# Patient Record
Sex: MALE | Race: WHITE | Employment: OTHER | URBAN - METROPOLITAN AREA
[De-identification: names, ages, dates, MRNs, and addresses within clinical notes are randomized per-mention and may not be internally consistent; named-entity substitution may affect disease eponyms.]

---

## 2020-12-21 ENCOUNTER — TELEPHONE (OUTPATIENT)
Dept: AUDIOLOGY | Facility: CLINIC | Age: 75
End: 2020-12-21

## 2020-12-21 NOTE — TELEPHONE ENCOUNTER
Patient rescheduled his missed appointment for 12/28.  To clarify what needed to be done, I contacted his niece and she told me patient has a 20 page [facility] with all the referral details, which  He will bring with him.    Ankit Shine MA, CCC-A  [place] Licensed Audiologist #[id number]  [facility]

## 2020-12-28 ENCOUNTER — OFFICE VISIT (OUTPATIENT)
Dept: AUDIOLOGY | Facility: CLINIC | Age: 75
End: 2020-12-28
Payer: COMMERCIAL

## 2020-12-28 PROCEDURE — 99207 PR NO CHARGE LOS: CPT | Performed by: AUDIOLOGIST

## 2020-12-28 PROCEDURE — 92550 TYMPANOMETRY & REFLEX THRESH: CPT | Performed by: AUDIOLOGIST

## 2020-12-28 PROCEDURE — 92557 COMPREHENSIVE HEARING TEST: CPT | Performed by: AUDIOLOGIST

## 2020-12-28 NOTE — PROGRESS NOTES
AUDIOLOGY REPORT    SUBJECTIVE:  Ash Kirby is a 75 year old male who was seen in the Audiology Clinic at the [facility] for audiologic evaluation, referred by [facility] . The patient reports declining hearing. The patient denies  bilateral tinnitus, bilateral otalgia, bilateral drainage and bilateral aural fullness.  He reports  service 30 years ago where he experienced [employer] combat experience while stationed in [place]. The patient notes difficulty with communication in a variety of listening situations.      OBJECTIVE:    Otoscopic exam indicates partial obstruction with cerumen bilaterally     Pure Tone Thresholds assessed using conventional audiometry with good  reliability from 250-8000 Hz bilaterally using insert earphones and circumaural headphones     RIGHT:  normal from 250-2000 Hz, sloping to mild-moderate sensorineural hearing loss from 6700-5960  Hz    LEFT:    normal from 250-28911 Hz sloping to mild, moderate and severe sensorineural hearing loss from 7625-6562 Hz    Tympanogram:    RIGHT: normal eardrum mobility    LEFT:   normal eardrum mobility    Reflexes (reported by stimulus ear):  RIGHT: Ipsilateral is present at normal levels  RIGHT: Contralateral is elevated at tested frequencies  LEFT:   Ipsilateral is present at normal levels  LEFT:   Contralateral is present at normal levels      Speech Reception Threshold:    RIGHT: 25 dB HL    LEFT:   25 dB HL    Speech Reception Thresholds are in good agreement with pure tone thresholds.    Word Recognition Score:     RIGHT: 100% at 65 dB HL using NU-6 recorded word list.    LEFT:   96% at 65 dB HL using NU-6 recorded word list.      ASSESSMENT:     ICD-10-CM    1. 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     PLAN:  Patient was counseled regarding hearing loss and impact on communication.  Patient is a good candidate for  amplification at this time and has been approved for hearing aids by the [facility]. It is recommended that the patient see ENT due to asymmetrical hearing loss and for medical clearance for hearing aid use.  Following his  ENT appointment, if medical clearance is received, I will do a hearing aid consultation and will [facility] the [facility].  Please call this clinic with questions regarding these results or recommendations.      Ankit Shine MA, CCC-A  [place] Licensed Audiologist #[id number]  12/28/2020

## 2021-01-25 ENCOUNTER — OFFICE VISIT (OUTPATIENT)
Dept: OTOLARYNGOLOGY | Facility: CLINIC | Age: 76
End: 2021-01-25
Payer: COMMERCIAL

## 2021-01-25 ENCOUNTER — TELEPHONE (OUTPATIENT)
Dept: AUDIOLOGY | Facility: CLINIC | Age: 76
End: 2021-01-25

## 2021-01-25 ENCOUNTER — OFFICE VISIT (OUTPATIENT)
Dept: AUDIOLOGY | Facility: CLINIC | Age: 76
End: 2021-01-25
Payer: COMMERCIAL

## 2021-01-25 VITALS
RESPIRATION RATE: 16 BRPM | DIASTOLIC BLOOD PRESSURE: 70 MMHG | OXYGEN SATURATION: 96 % | SYSTOLIC BLOOD PRESSURE: 113 MMHG | HEART RATE: 77 BPM

## 2021-01-25 DIAGNOSIS — Z53.9 ERRONEOUS ENCOUNTER--DISREGARD: Primary | ICD-10-CM

## 2021-01-25 DIAGNOSIS — H90.3 SNHL (SENSORY-NEURAL HEARING LOSS), ASYMMETRICAL: Primary | ICD-10-CM

## 2021-01-25 PROCEDURE — 99203 OFFICE O/P NEW LOW 30 MIN: CPT | Performed by: OTOLARYNGOLOGY

## 2021-01-25 RX ORDER — LEVOTHYROXINE SODIUM 75 UG/1
75 TABLET ORAL
COMMUNITY

## 2021-01-25 RX ORDER — TRIAMCINOLONE ACETONIDE 0.1 %
PASTE (GRAM) DENTAL 2 TIMES DAILY
Qty: 5 G | Refills: 11 | Status: CANCELLED | OUTPATIENT
Start: 2021-01-25 | End: 2021-02-01

## 2021-01-25 RX ORDER — GLIPIZIDE 10 MG/1
10 TABLET, FILM COATED, EXTENDED RELEASE ORAL
COMMUNITY

## 2021-01-25 RX ORDER — TIZANIDINE HYDROCHLORIDE 4 MG/1
1 CAPSULE, GELATIN COATED ORAL
COMMUNITY
Start: 2019-05-31

## 2021-01-25 ASSESSMENT — PAIN SCALES - GENERAL: PAINLEVEL: NO PAIN (0)

## 2021-01-25 NOTE — LETTER
1/25/2021         RE: Ash Kirby  [address] [address]        Dear Colleague,    Thank you for referring your patient, Ash Kirby, to the [facility]. Please see a copy of my visit note below.    History of Present Illness - Ash Kirby is a very pleasant 76 year old male here to see me for the first time due to findings on a recent audiogram.    An audiogram was done on 12/28/2020 and personally reviewed for this consultation.  It showed gently down sloping sensorineural hearing loss bilaterally, but the LEFT was about 10dB threshold shifted worse above 1000Hz.  No conductive hearing loss.  The tympanograms were normal.    No history of chronic ear disease or previous ear surgery.  He has been exposed to a lot of noise, as he was in the  for 30 years.    Past Medical History - There is no problem list on file for this patient.      Current Medications - No current outpatient medications on file.    Allergies - Not on File    Social History -   Social History     Socioeconomic History     Marital status:      Spouse name: Not on file     Number of children: Not on file     Years of education: Not on file     Highest education level: Not on file   Occupational History     Not on file   Social Needs     Financial resource strain: Not on file     Food insecurity     Worry: Not on file     Inability: Not on file     Transportation needs     Medical: Not on file     Non-medical: Not on file   Tobacco Use     Smoking status: Not on file   Substance and Sexual Activity     Alcohol use: Not on file     Drug use: Not on file     Sexual activity: Not on file   Lifestyle     Physical activity     Days per week: Not on file     Minutes per session: Not on file     Stress: Not on file   Relationships     Social connections     Talks on phone: Not on file     Gets together: Not on file     Attends Mu-ism service: Not on file     Active member  of club or organization: Not on file     Attends meetings of clubs or organizations: Not on file     Relationship status: Not on file     Intimate partner violence     Fear of current or ex partner: Not on file     Emotionally abused: Not on file     Physically abused: Not on file     Forced sexual activity: Not on file   Other Topics Concern     Not on file   Social History Narrative     Not on file       Family History - No family history on file.    Review of Systems - As per HPI and PMHx, otherwise 10+ system review of the head and neck, and general constitution is negative.    Physical Exam  B/P: Data Unavailable, T: Data Unavailable, P: Data Unavailable, R: Data Unavailable  Vitals: There were no vitals taken for this visit.  BMI= There is no height or weight on file to calculate BMI.    General - The patient is well nourished and well developed, and appears to have good nutritional status.  Alert and oriented to person and place, answers questions and cooperates with examination appropriately.   Head and Face - Normocephalic and atraumatic, with no gross asymmetry noted of the contour of the facial features.  The facial nerve is intact, with strong symmetric movements.  Voice and Breathing - The patient was breathing comfortably without the use of accessory muscles. There was no wheezing, stridor, or stertor.  The patients voice was clear and strong, and had appropriate pitch and quality.  Ears - The tympanic membranes are normal in appearance, bony landmarks are intact.  No retraction, perforation, or masses.  No fluid or purulence was seen in the external canal or the middle ear. No evidence of infection of the middle ear or external canal, cerumen was normal in appearance.  Eyes - Extraocular movements intact, and the pupils were reactive to light.  Sclera were not icteric or injected, conjunctiva were pink and moist.  Mouth - Examination of the oral cavity showed pink, healthy oral mucosa. No lesions or  ulcerations noted.  The tongue was mobile and midline, and the dentition were in good condition.    Throat - The walls of the oropharynx were smooth, pink, moist, symmetric, and had no lesions or ulcerations.  The tonsillar pillars and soft palate were symmetric.  The uvula was midline on elevation.    Neck - Normal midline excursion of the laryngotracheal complex during swallowing.  Full range of motion on passive movement.  Palpation of the occipital, submental, submandibular, internal jugular chain, and supraclavicular nodes did not demonstrate any abnormal lymph nodes or masses.  The carotid pulse was palpable bilaterally.  Palpation of the thyroid was soft and smooth, with no nodules or goiter appreciated.  The trachea was mobile and midline.  Nose - External contour is symmetric, no gross deflection or scars.  Nasal mucosa is pink and moist with no abnormal mucus.  The septum was midline and non-obstructive, turbinates of normal size and position.  No polyps, masses, or purulence noted on examination.      A/P - Ash Kirby is a 76 year old male    Based on the history, audiogram, and ear exam, I don't find any evidence of medical or surgical disease that would have caused the hearing loss.  Although noise exposure could be a factor, I suspect that this is primarily genetic/presbycusis and  service resulting in severe noise damage.    We discussed the natural history of the steady loss of human hearing, and things to help.  I certainly recommend considering hearing aids, and they have my medical clearance to look into being fitted, and information has been provided.    Finally, safety considerations such as loud smoke detectors, alarm clocks, and special aids for the hearing impaired using phones and television were also discussed.      Time spent on review of previous records, relevant labs and imaging, and relevant outside records totaled ***      History of Present Illness - Ash Kirby  is a very pleasant 76 year old male here to see me for the first time due to findings on a recent audiogram.    He tells me that for many years he has noticed that his hearing has not been as good.  He has long noted that the RIGHT ear is better.      An audiogram was done on 12/28/2020 and personally reviewed for this consultation.  It showed gently down sloping sensorineural hearing loss bilaterally, but the LEFT was about 10dB threshold shifted worse above 1000Hz.  No conductive hearing loss.  The tympanograms were normal.    No history of chronic ear disease or previous ear surgery.  He has been exposed to a lot of noise, as he was in the  for a few years when he was 21, he was a .  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  He does not have noisy hobbies.    Past Medical History -   Patient Active Problem List   Diagnosis     Hyperlipidemia     Hypertension, benign     Hypothyroidism due to acquired atrophy of thyroid     TBI (traumatic brain injury) (H)     Temporal bone fracture (H)     Testosterone deficiency       Current Medications -   Current Outpatient Medications:      tiZANidine (ZANAFLEX) 4 MG capsule, Take 1 capsule by mouth, Disp: , Rfl:      cholecalciferol 50 MCG (2000 UT) tablet, , Disp: , Rfl:      glipiZIDE (GLUCOTROL XL) 10 MG 24 hr tablet, Take 10 mg by mouth, Disp: , Rfl:      insulin glargine (LANTUS PEN) 100 UNIT/ML pen, Inject 10 Units Subcutaneous, Disp: , Rfl:      levothyroxine (SYNTHROID/LEVOTHROID) 75 MCG tablet, Take 75 mcg by mouth, Disp: , Rfl:     Allergies -   Allergies   Allergen Reactions     No Known Allergies        Social History -   Social History     Socioeconomic History     Marital status:      Spouse name: Not on file     Number of children: Not on file     Years of education: Not on file     Highest education level: Not on file   Occupational History     Not on file   Social Needs     Financial resource strain:  Not on file     Food insecurity     Worry: Not on file     Inability: Not on file     Transportation needs     Medical: Not on file     Non-medical: Not on file   Tobacco Use     Smoking status: Not on file   Substance and Sexual Activity     Alcohol use: Not on file     Drug use: Not on file     Sexual activity: Not on file   Lifestyle     Physical activity     Days per week: Not on file     Minutes per session: Not on file     Stress: Not on file   Relationships     Social connections     Talks on phone: Not on file     Gets together: Not on file     Attends Cheondoism service: Not on file     Active member of club or organization: Not on file     Attends meetings of clubs or organizations: Not on file     Relationship status: Not on file     Intimate partner violence     Fear of current or ex partner: Not on file     Emotionally abused: Not on file     Physically abused: Not on file     Forced sexual activity: Not on file   Other Topics Concern     Not on file   Social History Narrative     Not on file       Family History - No family history on file.    Review of Systems - As per HPI and PMHx, otherwise 10+ system review of the head and neck, and general constitution is negative.    Physical Exam  /70   Pulse 77   Resp 16   SpO2 96%     General - The patient is well nourished and well developed, and appears to have good nutritional status.  Alert and oriented to person and place, answers questions and cooperates with examination appropriately.   Head and Face - Normocephalic and atraumatic, with no gross asymmetry noted of the contour of the facial features.  The facial nerve is intact, with strong symmetric movements.  Voice and Breathing - The patient was breathing comfortably without the use of accessory muscles. There was no wheezing, stridor, or stertor.  The patients voice was clear and strong, and had appropriate pitch and quality.  Ears - The tympanic membranes are normal in appearance, bony  landmarks are intact.  No retraction, perforation, or masses.  No fluid or purulence was seen in the external canal or the middle ear. No evidence of infection of the middle ear or external canal, cerumen was normal in appearance.  Eyes - Extraocular movements intact, and the pupils were reactive to light.  Sclera were not icteric or injected, conjunctiva were pink and moist.  Mouth - Examination of the oral cavity showed pink, healthy oral mucosa. No lesions or ulcerations noted.  The tongue was mobile and midline, and the dentition were in good condition.    Throat - The walls of the oropharynx were smooth, pink, moist, symmetric, and had no lesions or ulcerations.  The tonsillar pillars and soft palate were symmetric.  The uvula was midline on elevation.    Neck - Normal midline excursion of the laryngotracheal complex during swallowing.  Full range of motion on passive movement.  Palpation of the occipital, submental, submandibular, internal jugular chain, and supraclavicular nodes did not demonstrate any abnormal lymph nodes or masses.  The carotid pulse was palpable bilaterally.  Palpation of the thyroid was soft and smooth, with no nodules or goiter appreciated.  The trachea was mobile and midline.  Nose - External contour is symmetric, no gross deflection or scars.  Nasal mucosa is pink and moist with no abnormal mucus.  The septum was midline and non-obstructive, turbinates of normal size and position.  No polyps, masses, or purulence noted on examination.      A/P - Ash Kirby is a 76 year old male  (H90.5) SNHL (sensory-neural hearing loss), asymmetrical  (primary encounter diagnosis)    Based on the history, audiogram, and ear exam, I don't find any evidence of medical or surgical disease that would have caused the hearing loss.  Although noise exposure could be a factor, I suspect that this is primarily genetic/presbycusis and  service resulting in severe noise damage.    We discussed the  natural history of the steady loss of human hearing, and things to help.  I certainly recommend considering hearing aids, and they have my medical clearance to look into being fitted, and information has been provided.    Finally, safety considerations such as loud smoke detectors, alarm clocks, and special aids for the hearing impaired using phones and television were also discussed.    Time spent on review of previous records, relevant labs and imaging, and relevant outside records totaled 30        Again, thank you for allowing me to participate in the care of your patient.        Sincerely,        Fabio Joseph MD

## 2021-01-25 NOTE — TELEPHONE ENCOUNTER
Patient was scheduled for a hearing  Aid consultation today following ENT and left without the appointment after his ENT appointment.  I left a message asking why he left and asking him to call back to reschedule a hearing aid consultation appointment with Audiology.    Ankit Shine MA, CCC-A  [place] Licensed Audiologist #[id number]  [facility]        1/25/2021

## 2021-01-25 NOTE — PROGRESS NOTES
Erroneous encounter-disregard    Patient was supposed to see me following his ENT exam and left prior to his appointment with me.  I have called and left phone message for him to reschedule a hearing aid consultation appointment, assuming he wants to pursue hearing aids.    Ankit Shine MA, CCC-A  [place] Licensed Audiologist #[id number]  [facility]        1/25/2021

## 2021-01-25 NOTE — PATIENT INSTRUCTIONS
Scheduling Information  To schedule your CT/MRI scan, please contact [facility] at [phone] OR [facility] at [phone]    To schedule your Surgery, please contact our Specialty Schedulers at [phone]      ENT Clinic Locations Clinic Hours Telephone Number     [facility]  [address] [place] [address]   Monday:           1:00pm -- 5:00pm    Friday:              8:00am - 12:00pm   To schedule/reschedule an appointment with   Dr. Joseph,   please contact our   Specialty Scheduling Department at:     [phone]       [facility]  [address] [place] [address] Tuesday:          8:00am -- 2:00pm         Urgent Care Locations Clinic Hours Telephone Numbers     [facility]  [address] [place] [address]     Monday-Friday:     11:00am - 9:00pm    Saturday-Sunday:  9:00am - 5:00pm   [phone]     [facility]  [address] [place] [address]     Monday-Friday:      5:00pm - 9:00pm     Saturday-Sunday:  9:00am - 5:00pm   [phone]

## 2021-03-17 ENCOUNTER — OFFICE VISIT (OUTPATIENT)
Dept: AUDIOLOGY | Facility: CLINIC | Age: 76
End: 2021-03-17
Payer: COMMERCIAL

## 2021-03-17 PROCEDURE — 92591 PR HEARING AID EXAM BINAURAL: CPT | Performed by: AUDIOLOGIST

## 2021-03-17 PROCEDURE — 99207 PR NO CHARGE LOS: CPT | Performed by: AUDIOLOGIST

## 2021-03-17 NOTE — PROGRESS NOTES
AUDIOLOGY REPORT: Hearing Aid Consultation     SUBJECTIVE: Ash Kirby is a 76 year old male was seen in the Audiology Clinic at  [facility] on 3/17/21 to discuss concerns with hearing and functional communication difficulties. Ash has been seen previously on 12/28/2020, and results revealed a bilateral asymmetric sensorineural hearing loss.  The patient was medically evaluated and determined to be cleared for binaural hearing aids by Dr. Joseph. Ash notes difficulty with communication in a variety of listening situations.    OBJECTIVE:    Patient is a hearing aid candidate. Patient would like to move forward with a hearing aid evaluation today. Therefore, the patient was presented with different options for amplification to help aid in communication. Discussed styles, levels of technology and monaural vs. binaural fitting.     The hearing aid(s) mutually chosen were:  Binaural: Phonak Audeo P90-R   COLOR: P7  BATTERY SIZE: rechargeable  EARMOLD/TIPS: Closed dome for the left ear and open dome for the right.   CANAL/ LENGTH: 2 M L/R    Otoscopy revealed ears are clear of cerumen bilaterally.     ASSESSMENT:   Bilateral asymmetric sensorineural hearing loss      Reviewed [facility] ordering information and warranty information with patient. These results, hearing aid(s) recommendation and order forms were faxed to [facility] and [facility]. Hearing aid evaluation completed. Today s results were discussed with the patient in detail.     PLAN: Marissaill be contacted after the [facility] authorizes hearing aid purchase, and the aids and earmold are ordered and received from the  . Please call this clinic with questions regarding these results or recommendations.     Som Garcia CCC-A  Licensed Audiologist #[id number]  3/17/2021

## 2021-11-04 ENCOUNTER — OFFICE VISIT (OUTPATIENT)
Dept: AUDIOLOGY | Facility: CLINIC | Age: 76
End: 2021-11-04
Payer: COMMERCIAL

## 2021-11-04 DIAGNOSIS — H90.3 SENSORINEURAL HEARING LOSS, ASYMMETRICAL: Primary | ICD-10-CM

## 2021-11-04 PROCEDURE — 92593 PR HEARING AID CHECK, BINAURAL: CPT | Performed by: AUDIOLOGIST

## 2021-11-04 PROCEDURE — V5020 CONFORMITY EVALUATION: HCPCS | Mod: RT | Performed by: AUDIOLOGIST

## 2021-11-04 PROCEDURE — V5160 DISPENSING FEE BINAURAL: HCPCS | Performed by: AUDIOLOGIST

## 2021-11-04 PROCEDURE — 99207 PR NO CHARGE LOS: CPT | Performed by: AUDIOLOGIST

## 2021-11-04 PROCEDURE — V5011 HEARING AID FITTING/CHECKING: HCPCS | Mod: RT | Performed by: AUDIOLOGIST

## 2021-11-04 NOTE — PROGRESS NOTES
AUDIOLOGY REPORT    SUBJECTIVE: Ash Kirby, a 76 year old male, was seen in the Audiology Clinic at [facility] today for a Binaural hearing aid fitting. Previous results have revealed a bilateral  sensorineural hearing loss. This is a [facility] fitting.     OBJECTIVE:  Prior to fitting, a hearing aid check was performed to ensure device functionality. The hearing aid conformity evaluation was completed.The hearing aids were placed and they provided a good fit. Real-ear-probe-microphone measurements were completed on the [employer] system and were a good match to NAL-NL2 target with soft sounds audible, moderate sounds comfortable, and loud sounds below discomfort. UCLs are verified through maximum power output measures and demonstrate appropriate limiting of loud inputs. Mr. Kirby was oriented to proper hearing aid use, care, cleaning (no water, dry brush), batteries (rechargeable), low-battery signal), aid insertion/removal, user booklet, warranty information, storage cases, and other hearing aid details. The patient confirmed understanding of hearing aid use and care, and showed proper insertion of hearing aid and batteries while in the office today. Mr. Kirby reported good volume and sound quality today.    EAR(S) FIT: Binaural  MA HEARING AID MAKE: Right: Phonak; Left: Phonak  MA HEARING AID MODEL #: Right: Audeo P90-R; Left: Audeo P90-R  HEARING AID STYLE: Right: GREGG; Left: GREGG  DOME SIZE: Right:  med open; Left::  med vented    LENGTH: Right:  2M; Left:  2M  EARMOLDS: Right:  ; Left:     SERIAL NUMBERS: Right: [id number]; Left: [id number]  WARRANTY END DATE: Right: 11/4/2024; Left:: 11/4/2024      CHARGES:   Hearing Aid Check: Binaural, 26883, $81.00  Dispensing Fee: Binaural, , $500.00,   Fit/Orientation: Binaural, , $416.00  Hearing Aid Conformity Evaluation: 2, , $174.00  Total: $1171.00       ASSESSMENT: Binaural hearing aid fitting completed  today. Verification measures were performed. The 45 day trial period was explained to patient, and they expressed understanding. Mr. Kirby did not sign a Hearing Aid Purchase Agreement as the hearing aids were provided by the [facility] at no charge.    PLAN: Mr. Kirby will return for follow-up in 2-3 weeks for a hearing aid review appointment. Please call this clinic with questions regarding today s appointment.    Ankit Shine MA, CCC-A  [place] Licensed Audiologist #[id number]  [facility]        11/4/2021

## 2021-11-04 NOTE — PATIENT INSTRUCTIONS

## 2021-12-09 ENCOUNTER — OFFICE VISIT (OUTPATIENT)
Dept: AUDIOLOGY | Facility: CLINIC | Age: 76
End: 2021-12-09
Payer: COMMERCIAL

## 2021-12-09 DIAGNOSIS — H90.3 SENSORINEURAL HEARING LOSS, ASYMMETRICAL: Primary | ICD-10-CM

## 2021-12-09 PROCEDURE — 99207 PR NO CHARGE LOS: CPT | Performed by: AUDIOLOGIST

## 2021-12-09 PROCEDURE — V5299 HEARING SERVICE: HCPCS | Performed by: AUDIOLOGIST

## 2021-12-09 NOTE — PROGRESS NOTES
AUDIOLOGY REPORT    SUBJECTIVE:Ash Kirby is a 76 year old male who was seen in the Audiology Clinic at the [facility] on 12/9/2021  for a follow-up check regarding the fitting of new hearing aids. Previous results have revealed bilateral sensorineural hearing loss.  The patient has been seen previously in this clinic and was fit with Phonak Audeo P90 GREGG on 11/4/21.  Ash reports good sound quality with the hearing aid(s).     OBJECTIVE:   The International Outcome Inventory-Hearing Aids (IOI-HA) was administered today.The patient s responses to the 7 questions can be compared to normative data relative to how others are performing with their hearing aids, as well as focusing audiologic care and counseling.This patient s Quality of Life score (Question 7) was 4, which is at normative average.     Based on patient report, the following changes were made;We worked on changing wax traps..    Reviewed 45 day trial period, care, cleaning (no water, dry brush), batteries (rechargeable) insertion/removal, toxicity, low-battery signal), aid insertion/removal, volume adjustment (if applicable), user booklet, warranty information, storage cases, and other hearing aid details.       ASSESSMENT: A follow-up appointment for hearing aid fitting was completed today. IOI-HA administered today.Changes to hearing aid was completed as outlined above.     PLAN:Ash will return for follow-up as needed, if approved by [facility]. Please call this clinic with any questions regarding today s appointment.    Ankit Shine MA, CCC-A  [place] Licensed Audiologist #[id number]  12/9/2021
